# Patient Record
Sex: FEMALE | Race: WHITE | NOT HISPANIC OR LATINO | Employment: UNEMPLOYED | URBAN - METROPOLITAN AREA
[De-identification: names, ages, dates, MRNs, and addresses within clinical notes are randomized per-mention and may not be internally consistent; named-entity substitution may affect disease eponyms.]

---

## 2023-02-01 PROBLEM — R05.3 CHRONIC COUGH: Status: ACTIVE | Noted: 2023-02-01

## 2023-02-01 PROBLEM — F98.8 ATTENTION DEFICIT DISORDER WITHOUT HYPERACTIVITY: Status: ACTIVE | Noted: 2023-02-01

## 2023-02-01 PROBLEM — J30.9 ALLERGIC RHINITIS: Status: ACTIVE | Noted: 2023-02-01

## 2023-02-01 PROBLEM — R51.9 HEADACHE: Status: ACTIVE | Noted: 2023-02-01

## 2023-02-01 PROBLEM — R53.83 FATIGUE: Status: ACTIVE | Noted: 2023-02-01

## 2023-02-01 PROBLEM — G47.9 SLEEP DISORDER: Status: ACTIVE | Noted: 2023-02-01

## 2023-02-01 PROBLEM — E55.9 VITAMIN D DEFICIENCY: Status: ACTIVE | Noted: 2023-02-01

## 2023-02-01 PROBLEM — G47.10 HYPERSOMNIA: Status: ACTIVE | Noted: 2023-02-01

## 2023-02-01 PROBLEM — N92.6 MENSTRUAL IRREGULARITY: Status: ACTIVE | Noted: 2023-02-01

## 2023-02-01 PROBLEM — F41.9 ANXIETY: Status: ACTIVE | Noted: 2023-02-01

## 2023-02-01 PROBLEM — E83.10 DISORDER OF IRON METABOLISM: Status: ACTIVE | Noted: 2023-02-01

## 2023-02-01 PROBLEM — E66.3 OVERWEIGHT WITH BODY MASS INDEX (BMI) OF 28 TO 28.9 IN ADULT: Status: ACTIVE | Noted: 2023-02-01

## 2023-02-01 PROBLEM — H93.19 TINNITUS: Status: ACTIVE | Noted: 2023-02-01

## 2023-02-01 PROBLEM — R10.31 ABDOMINAL PAIN, RLQ (RIGHT LOWER QUADRANT): Status: ACTIVE | Noted: 2023-02-01

## 2023-02-01 PROBLEM — G43.909 MIGRAINE HEADACHE: Status: ACTIVE | Noted: 2023-02-01

## 2023-02-01 PROBLEM — R25.8 NOCTURNAL LEG MOVEMENTS: Status: ACTIVE | Noted: 2023-02-01

## 2023-02-01 PROBLEM — K62.89 ANAL IRRITATION: Status: ACTIVE | Noted: 2023-02-01

## 2023-02-01 RX ORDER — CHOLECALCIFEROL (VITAMIN D3) 125 MCG
CAPSULE ORAL
COMMUNITY
Start: 2022-06-27 | End: 2023-03-29

## 2023-02-01 RX ORDER — DICLOFENAC POTASSIUM 50 MG/1
POWDER, FOR SOLUTION ORAL AS NEEDED
COMMUNITY
Start: 2022-06-27

## 2023-02-01 RX ORDER — DEXTROAMPHETAMINE SACCHARATE, AMPHETAMINE ASPARTATE, DEXTROAMPHETAMINE SULFATE AND AMPHETAMINE SULFATE 3.75; 3.75; 3.75; 3.75 MG/1; MG/1; MG/1; MG/1
1 TABLET ORAL DAILY
COMMUNITY
Start: 2022-07-15

## 2023-02-01 RX ORDER — DULOXETIN HYDROCHLORIDE 20 MG/1
2 CAPSULE, DELAYED RELEASE ORAL DAILY
COMMUNITY
End: 2023-07-26 | Stop reason: SDUPTHER

## 2023-03-15 ENCOUNTER — APPOINTMENT (OUTPATIENT)
Dept: PRIMARY CARE | Facility: CLINIC | Age: 23
End: 2023-03-15
Payer: COMMERCIAL

## 2023-03-29 ENCOUNTER — OFFICE VISIT (OUTPATIENT)
Dept: PRIMARY CARE | Facility: CLINIC | Age: 23
End: 2023-03-29
Payer: COMMERCIAL

## 2023-03-29 VITALS
WEIGHT: 169.8 LBS | BODY MASS INDEX: 28.99 KG/M2 | HEIGHT: 64 IN | SYSTOLIC BLOOD PRESSURE: 120 MMHG | HEART RATE: 77 BPM | TEMPERATURE: 97.5 F | DIASTOLIC BLOOD PRESSURE: 79 MMHG

## 2023-03-29 DIAGNOSIS — Z01.419 WELL WOMAN EXAM: Primary | ICD-10-CM

## 2023-03-29 PROBLEM — R05.3 CHRONIC COUGH: Status: RESOLVED | Noted: 2023-02-01 | Resolved: 2023-03-29

## 2023-03-29 PROBLEM — H93.19 TINNITUS: Status: RESOLVED | Noted: 2023-02-01 | Resolved: 2023-03-29

## 2023-03-29 PROBLEM — R10.31 ABDOMINAL PAIN, RLQ (RIGHT LOWER QUADRANT): Status: RESOLVED | Noted: 2023-02-01 | Resolved: 2023-03-29

## 2023-03-29 PROBLEM — R51.9 HEADACHE: Status: RESOLVED | Noted: 2023-02-01 | Resolved: 2023-03-29

## 2023-03-29 PROBLEM — N92.6 MENSTRUAL IRREGULARITY: Status: RESOLVED | Noted: 2023-02-01 | Resolved: 2023-03-29

## 2023-03-29 PROCEDURE — 87591 N.GONORRHOEAE DNA AMP PROB: CPT

## 2023-03-29 PROCEDURE — 87491 CHLMYD TRACH DNA AMP PROBE: CPT

## 2023-03-29 PROCEDURE — 1036F TOBACCO NON-USER: CPT | Performed by: NURSE PRACTITIONER

## 2023-03-29 PROCEDURE — 99395 PREV VISIT EST AGE 18-39: CPT | Performed by: NURSE PRACTITIONER

## 2023-03-29 PROCEDURE — 88175 CYTOPATH C/V AUTO FLUID REDO: CPT
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
## 2023-03-30 PROBLEM — Z01.419 WELL WOMAN EXAM: Status: ACTIVE | Noted: 2023-03-30

## 2023-03-30 ASSESSMENT — ENCOUNTER SYMPTOMS
DYSPHORIC MOOD: 0
WHEEZING: 0
COUGH: 0
DIFFICULTY URINATING: 0
BACK PAIN: 0
SORE THROAT: 0
WEAKNESS: 0
DYSURIA: 0
FATIGUE: 0
DIZZINESS: 0
NERVOUS/ANXIOUS: 0
PHOTOPHOBIA: 0
ADENOPATHY: 0
UNEXPECTED WEIGHT CHANGE: 0
SLEEP DISTURBANCE: 0
CONSTIPATION: 0
CHEST TIGHTNESS: 0
SPEECH DIFFICULTY: 0
NAUSEA: 0
FREQUENCY: 0
MYALGIAS: 0
VOMITING: 0
RHINORRHEA: 0
PALPITATIONS: 0
SHORTNESS OF BREATH: 0
DIARRHEA: 0
FEVER: 0
CHILLS: 0
BLOOD IN STOOL: 0
BRUISES/BLEEDS EASILY: 0
POLYDIPSIA: 0
ABDOMINAL PAIN: 0
EYE DISCHARGE: 0
ARTHRALGIAS: 0
EYE PAIN: 0
EYE ITCHING: 0
NUMBNESS: 0
HEMATURIA: 0
HEADACHES: 0
POLYPHAGIA: 0
EYE REDNESS: 0
NECK PAIN: 0
SINUS PRESSURE: 0

## 2023-03-31 LAB
CHLAMYDIA TRACH., AMPLIFIED: NEGATIVE
N. GONORRHEA, AMPLIFIED: NEGATIVE

## 2023-04-04 LAB
COMPLETE PATHOLOGY REPORT: NORMAL
CONVERTED CLINICAL DIAGNOSIS-HISTORY: NORMAL
CONVERTED DIAGNOSIS COMMENT: NORMAL
CONVERTED FINAL DIAGNOSIS: NORMAL
CONVERTED FINAL REPORT PDF LINK TO COPY AND PASTE: NORMAL

## 2023-04-06 ENCOUNTER — TELEPHONE (OUTPATIENT)
Dept: PRIMARY CARE | Facility: CLINIC | Age: 23
End: 2023-04-06
Payer: COMMERCIAL

## 2023-04-06 NOTE — TELEPHONE ENCOUNTER
----- Message from TRENT Medrano sent at 4/4/2023  3:17 PM EDT -----  Please let her know her PAP was normal. Repeat testing in 3 years.

## 2023-07-26 DIAGNOSIS — F41.9 ANXIETY: Primary | ICD-10-CM

## 2023-07-26 RX ORDER — DULOXETIN HYDROCHLORIDE 20 MG/1
40 CAPSULE, DELAYED RELEASE ORAL DAILY
Qty: 180 CAPSULE | Refills: 0 | Status: SHIPPED | OUTPATIENT
Start: 2023-07-26 | End: 2023-11-22 | Stop reason: SDUPTHER

## 2023-07-26 NOTE — TELEPHONE ENCOUNTER
Patient requesting refill on her Duloxetine until she can make an appointment, Rx pended for approval

## 2023-11-22 ENCOUNTER — TELEPHONE (OUTPATIENT)
Dept: PRIMARY CARE | Facility: CLINIC | Age: 23
End: 2023-11-22
Payer: COMMERCIAL

## 2023-11-22 DIAGNOSIS — F41.9 ANXIETY: ICD-10-CM

## 2023-11-22 RX ORDER — DULOXETIN HYDROCHLORIDE 20 MG/1
40 CAPSULE, DELAYED RELEASE ORAL DAILY
Qty: 180 CAPSULE | Refills: 0 | Status: SHIPPED | OUTPATIENT
Start: 2023-11-22 | End: 2024-02-14 | Stop reason: SDUPTHER

## 2024-02-14 ENCOUNTER — OFFICE VISIT (OUTPATIENT)
Dept: PRIMARY CARE | Facility: CLINIC | Age: 24
End: 2024-02-14
Payer: COMMERCIAL

## 2024-02-14 VITALS
HEART RATE: 82 BPM | TEMPERATURE: 97.2 F | DIASTOLIC BLOOD PRESSURE: 82 MMHG | BODY MASS INDEX: 30.35 KG/M2 | HEIGHT: 65 IN | WEIGHT: 182.2 LBS | OXYGEN SATURATION: 98 % | SYSTOLIC BLOOD PRESSURE: 102 MMHG

## 2024-02-14 DIAGNOSIS — R39.15 URGENCY OF URINATION: ICD-10-CM

## 2024-02-14 DIAGNOSIS — R31.9 URINARY TRACT INFECTION WITH HEMATURIA, SITE UNSPECIFIED: Primary | ICD-10-CM

## 2024-02-14 DIAGNOSIS — R30.0 BURNING WITH URINATION: ICD-10-CM

## 2024-02-14 DIAGNOSIS — N39.0 URINARY TRACT INFECTION WITH HEMATURIA, SITE UNSPECIFIED: Primary | ICD-10-CM

## 2024-02-14 DIAGNOSIS — F41.9 ANXIETY: ICD-10-CM

## 2024-02-14 LAB
POC APPEARANCE, URINE: ABNORMAL
POC BILIRUBIN, URINE: NEGATIVE
POC BLOOD, URINE: ABNORMAL
POC COLOR, URINE: YELLOW
POC GLUCOSE, URINE: NEGATIVE MG/DL
POC KETONES, URINE: ABNORMAL MG/DL
POC LEUKOCYTES, URINE: ABNORMAL
POC NITRITE,URINE: NEGATIVE
POC PH, URINE: 6.5 PH
POC PROTEIN, URINE: ABNORMAL MG/DL
POC SPECIFIC GRAVITY, URINE: 1.02
POC UROBILINOGEN, URINE: 0.2 EU/DL

## 2024-02-14 PROCEDURE — 87086 URINE CULTURE/COLONY COUNT: CPT

## 2024-02-14 PROCEDURE — 87186 SC STD MICRODIL/AGAR DIL: CPT

## 2024-02-14 PROCEDURE — 81003 URINALYSIS AUTO W/O SCOPE: CPT | Performed by: NURSE PRACTITIONER

## 2024-02-14 PROCEDURE — 99214 OFFICE O/P EST MOD 30 MIN: CPT | Performed by: NURSE PRACTITIONER

## 2024-02-14 PROCEDURE — 1036F TOBACCO NON-USER: CPT | Performed by: NURSE PRACTITIONER

## 2024-02-14 RX ORDER — NITROFURANTOIN 25; 75 MG/1; MG/1
100 CAPSULE ORAL 2 TIMES DAILY
Qty: 10 CAPSULE | Refills: 0 | Status: SHIPPED | OUTPATIENT
Start: 2024-02-14 | End: 2024-02-19

## 2024-02-14 RX ORDER — DULOXETIN HYDROCHLORIDE 20 MG/1
40 CAPSULE, DELAYED RELEASE ORAL DAILY
Qty: 180 CAPSULE | Refills: 3 | Status: SHIPPED | OUTPATIENT
Start: 2024-02-14

## 2024-02-14 ASSESSMENT — ENCOUNTER SYMPTOMS
NAUSEA: 0
DIARRHEA: 0
DYSURIA: 1
CHILLS: 0
NERVOUS/ANXIOUS: 1
DIFFICULTY URINATING: 0
FATIGUE: 0
VOMITING: 0
ABDOMINAL PAIN: 0
FREQUENCY: 1
SLEEP DISTURBANCE: 0
FEVER: 0
DYSPHORIC MOOD: 0

## 2024-02-14 ASSESSMENT — PATIENT HEALTH QUESTIONNAIRE - PHQ9
1. LITTLE INTEREST OR PLEASURE IN DOING THINGS: NOT AT ALL
2. FEELING DOWN, DEPRESSED OR HOPELESS: NOT AT ALL
SUM OF ALL RESPONSES TO PHQ9 QUESTIONS 1 AND 2: 0

## 2024-02-14 NOTE — PROGRESS NOTES
"Rika Jalloh is a 23 y.o. female who presents for UTI (Burning while urinating and a feeling that she has to pee but can't pee. She also reported she was spotting when she was done with her period a 1.5 week ago).    UTI   Associated symptoms include frequency, hematuria and urgency. Pertinent negatives include no chills, nausea or vomiting.     She presents to the office with UTI symptoms since Monday. Noticed some spotting on Monday which she was not sure the cause.  Woke up this morning and had some burning with urination.  (+) frequency and urgency.  No fever or chills.  (+) blood noted in urine  No cloudy or foul smelling urine.  Feeling well in general. No back pain.  (+) cramping last night.  No other abdominal pain, nausea vomiting or diarrhea  LMP: 1/31/2024    Taking the Cymbalta as prescribed. Taking daily in the morning  No side effects.  No feeling sad, down, helpless or hopeless.  Motivation is ok.  No SI or HI.  Increased stress- just started Masters program  (+) excessive worry  No worry about worst case scenarios.  No panic attack.  Sleeping ok- takes Melatonin occasionally  Diet: Eating healthier  Exercise: Exercising 1 day a week- pilates  Caffeine use: rare  Alcohol use: 3 drinks a week  Drug use: None    Review of Systems   Constitutional:  Negative for chills, fatigue and fever.   Gastrointestinal:  Negative for abdominal pain, diarrhea, nausea and vomiting.   Genitourinary:  Positive for dysuria, frequency, hematuria and urgency. Negative for difficulty urinating.   Psychiatric/Behavioral:  Negative for dysphoric mood, self-injury, sleep disturbance and suicidal ideas. The patient is nervous/anxious.      Objective   /82 (BP Location: Left arm, Patient Position: Sitting)   Pulse 82   Temp 36.2 °C (97.2 °F) (Temporal)   Ht 1.646 m (5' 4.8\")   Wt 82.6 kg (182 lb 3.2 oz)   SpO2 98%   BMI 30.50 kg/m²     Physical Exam  Constitutional:       General: She is not in " acute distress.     Appearance: Normal appearance. She is not toxic-appearing.   Neck:      Thyroid: No thyroid mass or thyromegaly.   Cardiovascular:      Rate and Rhythm: Normal rate and regular rhythm.      Pulses: Normal pulses.      Heart sounds: Normal heart sounds, S1 normal and S2 normal. No murmur heard.  Pulmonary:      Effort: Pulmonary effort is normal.      Breath sounds: Normal breath sounds and air entry.   Abdominal:      General: Bowel sounds are normal.      Palpations: Abdomen is soft.      Tenderness: There is no abdominal tenderness. There is no right CVA tenderness or left CVA tenderness.   Musculoskeletal:      Right lower leg: No edema.      Left lower leg: No edema.   Lymphadenopathy:      Cervical: No cervical adenopathy.   Neurological:      Mental Status: She is alert and oriented to person, place, and time.   Psychiatric:         Mood and Affect: Mood normal.         Behavior: Behavior normal.         Thought Content: Thought content normal.         Judgment: Judgment normal.     Assessment/Plan   Problem List Items Addressed This Visit       Anxiety - Primary     Well-controlled.  Continue Cymbalta at current dose.         Relevant Medications    DULoxetine (Cymbalta) 20 mg DR capsule     Other Visit Diagnoses       Burning with urination        Relevant Medications    nitrofurantoin, macrocrystal-monohydrate, (Macrobid) 100 mg capsule    Other Relevant Orders    POCT UA Automated manually resulted (Completed)    Urine Culture    Urgency of urination        Relevant Medications    nitrofurantoin, macrocrystal-monohydrate, (Macrobid) 100 mg capsule    Other Relevant Orders    POCT UA Automated manually resulted (Completed)    Urine Culture    Urinary tract infection with hematuria, site unspecified        Relevant Medications    nitrofurantoin, macrocrystal-monohydrate, (Macrobid) 100 mg capsule          It has been a pleasure seeing you today!

## 2024-02-14 NOTE — PATIENT INSTRUCTIONS
Take the antibiotic as directed.  Will send the urine out for a culture and follow up on results.  Medications refilled today.

## 2024-02-15 ASSESSMENT — ENCOUNTER SYMPTOMS: HEMATURIA: 1

## 2024-02-17 LAB — BACTERIA UR CULT: ABNORMAL

## 2024-04-19 ENCOUNTER — TELEPHONE (OUTPATIENT)
Dept: PRIMARY CARE | Facility: CLINIC | Age: 24
End: 2024-04-19
Payer: COMMERCIAL

## 2024-04-19 NOTE — TELEPHONE ENCOUNTER
Pt is calling back saying should she stop taking the macrobid? She was told by [facility] she can either stop or continue taking the macrobid. Please advise.

## 2024-04-19 NOTE — TELEPHONE ENCOUNTER
Pt is calling in saying she had a recent visit to [facility] because she thought she had a UTI. She was told her WBC were elevated, started her on antibiotics but then called her back and said she didn't have a UTI. She mentioned last time she was in, you both discuss other options and she is wondering her next steps.

## 2024-04-23 ENCOUNTER — OFFICE VISIT (OUTPATIENT)
Dept: PRIMARY CARE | Facility: CLINIC | Age: 24
End: 2024-04-23
Payer: COMMERCIAL

## 2024-04-23 VITALS
TEMPERATURE: 97.3 F | BODY MASS INDEX: 30.44 KG/M2 | DIASTOLIC BLOOD PRESSURE: 72 MMHG | SYSTOLIC BLOOD PRESSURE: 100 MMHG | HEART RATE: 81 BPM | WEIGHT: 181.8 LBS | OXYGEN SATURATION: 97 %

## 2024-04-23 DIAGNOSIS — F41.9 ANXIETY: ICD-10-CM

## 2024-04-23 DIAGNOSIS — E07.89 THYROID FULLNESS: ICD-10-CM

## 2024-04-23 DIAGNOSIS — F98.8 ATTENTION DEFICIT DISORDER WITHOUT HYPERACTIVITY: Primary | ICD-10-CM

## 2024-04-23 DIAGNOSIS — Z51.81 THERAPEUTIC DRUG MONITORING: ICD-10-CM

## 2024-04-23 DIAGNOSIS — R30.0 DYSURIA: ICD-10-CM

## 2024-04-23 PROCEDURE — 80324 DRUG SCREEN AMPHETAMINES 1/2: CPT

## 2024-04-23 PROCEDURE — 81025 URINE PREGNANCY TEST: CPT | Performed by: NURSE PRACTITIONER

## 2024-04-23 PROCEDURE — 99214 OFFICE O/P EST MOD 30 MIN: CPT | Performed by: NURSE PRACTITIONER

## 2024-04-23 PROCEDURE — 80307 DRUG TEST PRSMV CHEM ANLYZR: CPT

## 2024-04-23 PROCEDURE — 87205 SMEAR GRAM STAIN: CPT

## 2024-04-23 PROCEDURE — 81003 URINALYSIS AUTO W/O SCOPE: CPT | Performed by: NURSE PRACTITIONER

## 2024-04-23 RX ORDER — DEXTROAMPHETAMINE SACCHARATE, AMPHETAMINE ASPARTATE, DEXTROAMPHETAMINE SULFATE AND AMPHETAMINE SULFATE 3.75; 3.75; 3.75; 3.75 MG/1; MG/1; MG/1; MG/1
15 TABLET ORAL DAILY
Qty: 30 TABLET | Refills: 0 | Status: SHIPPED | OUTPATIENT
Start: 2024-04-23 | End: 2024-05-23

## 2024-04-23 RX ORDER — NITROFURANTOIN 25; 75 MG/1; MG/1
CAPSULE ORAL
COMMUNITY
Start: 2024-04-16 | End: 2024-04-23 | Stop reason: ALTCHOICE

## 2024-04-23 RX ORDER — DEXTROAMPHETAMINE SACCHARATE, AMPHETAMINE ASPARTATE, DEXTROAMPHETAMINE SULFATE AND AMPHETAMINE SULFATE 3.75; 3.75; 3.75; 3.75 MG/1; MG/1; MG/1; MG/1
15 TABLET ORAL DAILY
Qty: 30 TABLET | Refills: 0 | Status: SHIPPED | OUTPATIENT
Start: 2024-06-22 | End: 2024-07-22

## 2024-04-23 RX ORDER — DULOXETIN HYDROCHLORIDE 30 MG/1
30 CAPSULE, DELAYED RELEASE ORAL DAILY
Qty: 90 CAPSULE | Refills: 0 | Status: SHIPPED | OUTPATIENT
Start: 2024-04-23

## 2024-04-23 RX ORDER — DEXTROAMPHETAMINE SACCHARATE, AMPHETAMINE ASPARTATE, DEXTROAMPHETAMINE SULFATE AND AMPHETAMINE SULFATE 3.75; 3.75; 3.75; 3.75 MG/1; MG/1; MG/1; MG/1
15 TABLET ORAL DAILY
Qty: 30 TABLET | Refills: 0 | Status: SHIPPED | OUTPATIENT
Start: 2024-05-23 | End: 2024-06-22

## 2024-04-23 ASSESSMENT — PATIENT HEALTH QUESTIONNAIRE - PHQ9
2. FEELING DOWN, DEPRESSED OR HOPELESS: SEVERAL DAYS
SUM OF ALL RESPONSES TO PHQ9 QUESTIONS 1 AND 2: 1
1. LITTLE INTEREST OR PLEASURE IN DOING THINGS: NOT AT ALL

## 2024-04-23 ASSESSMENT — ENCOUNTER SYMPTOMS
CHILLS: 0
SLEEP DISTURBANCE: 0
FATIGUE: 0
DYSURIA: 1
FEVER: 0
NERVOUS/ANXIOUS: 1
DYSPHORIC MOOD: 0

## 2024-04-23 NOTE — PROGRESS NOTES
Subjective    Santa Jalloh is a 24 y.o. female who presents for Gynecologic Exam and Anxiety (She wants to up medication dosage - and wants to update urine and contract for adderall).    HPI  She presents office today for a follow-up on intermittent burning with urination.  She reports she was swimming in the ocean and noticed some burning with urination about 2 weeks ago.  Had burning for 2 days, took Azo and symptoms went away for a week,, then came back.  (+)urinary frequency, urgency and burning.   (+) occasional burning for a day here and there as well  No unusual vaginal discharge.  No pain or odor with intercourse  (+) itching.   Period is due any day.   Generally spots when she ovulates.   Had intercourse about 2-3 weeks ago- pull out method.    Taking the Cymbalta as prescribed. Taking daily. Currently taking 40 mg daily.  No side effects.  No feeling sad, down, helpless or hopeless.  Motivation is good.  No SI or HI.  (+) Excessive worry  No worry about worst case scenarios.  Panic attacks: had one the other day -mostly work related.  (+) overwhelmed feeling.  Would like to increase the Duloxetine to 50 mg daily.    Counseling: In Counseling   Sleeping well with Melatonin  Diet: Overall pretty healthy  Exercise: once week.  Caffeine use:  rare caffeine  Alcohol use: 2 twice a week.  Drug use: None    OARRS:  MILAN Medrano-KIRTI on 4/23/2024  4:11 PM  I have personally reviewed the OARRS report for Santa Jalloh. I have considered the risks of abuse, dependence, addiction and diversion and I believe that it is clinically appropriate for Santa Jalloh to be prescribed this medication    Is the patient prescribed a combination of a benzodiazepine and opioid?  No    Last Urine Drug Screen / ordered today: Yes  No results found for this or any previous visit (from the past 8760 hour(s)).  N/A- ordered today    Controlled Substance Agreement:  Date of the Last Agreement: 04/23/2024  Reviewed  Controlled Substance Agreement including but not limited to the benefits, risks, and alternatives to treatment with a Controlled Substance medication(s).    Stimulants:   What is the patient's goal of therapy? Focus for school work- getting Masters degree  Is this being achieved with current treatment? yes    Activities of Daily Living:   Is your overall impression that this patient is benefiting (symptom reduction outweighs side effects) from stimulant therapy? Yes     1. Physical Functioning: Better  2. Family Relationship: Same  3. Social Relationship: Same  4. Mood: Same  5. Sleep Patterns: Same  6. Overall Function: Same    Reports appetite is decreased when taking Adderall. Sometimes a little shorter with others.   NO chest pain, SOB or palpitations.   Last dose of Adderall- one day last week.  Medication was started by pediatrics (notes reviewed). Generally takes intermittently but with being in school she feels she needs to remain on the mediation ever day.    Review of Systems   Constitutional:  Negative for chills, fatigue and fever.   Genitourinary:  Positive for dysuria and urgency. Negative for vaginal discharge and vaginal pain.   Psychiatric/Behavioral:  Negative for dysphoric mood, self-injury, sleep disturbance and suicidal ideas. The patient is nervous/anxious.        Objective   /72 (BP Location: Left arm, Patient Position: Sitting)   Pulse 81   Temp 36.3 °C (97.3 °F) (Temporal)   Wt 82.5 kg (181 lb 12.8 oz)   SpO2 97%   BMI 30.44 kg/m²     Physical Exam  Constitutional:       General: She is not in acute distress.     Appearance: Normal appearance. She is not toxic-appearing.   Eyes:      Extraocular Movements: Extraocular movements intact.      Conjunctiva/sclera: Conjunctivae normal.      Pupils: Pupils are equal, round, and reactive to light.   Neck:      Thyroid: Thyromegaly present.   Cardiovascular:      Rate and Rhythm: Normal rate and regular rhythm.      Pulses: Normal pulses.       Heart sounds: Normal heart sounds, S1 normal and S2 normal. No murmur heard.  Pulmonary:      Effort: Pulmonary effort is normal. No respiratory distress.      Breath sounds: Normal breath sounds and air entry.   Abdominal:      General: Bowel sounds are normal.      Palpations: Abdomen is soft.      Tenderness: There is no abdominal tenderness.   Genitourinary:     Cervix: Normal.      Comments: (+) blood noted in vagina  Musculoskeletal:      Right lower leg: No edema.      Left lower leg: No edema.   Lymphadenopathy:      Cervical: No cervical adenopathy.   Neurological:      Mental Status: She is alert and oriented to person, place, and time.   Psychiatric:         Attention and Perception: Attention normal.         Mood and Affect: Mood and affect normal.         Behavior: Behavior normal. Behavior is cooperative.         Thought Content: Thought content normal.         Cognition and Memory: Cognition normal.         Judgment: Judgment normal.         Assessment/Plan   Problem List Items Addressed This Visit       Anxiety    Relevant Medications    DULoxetine (Cymbalta) 30 mg DR capsule    Attention deficit disorder without hyperactivity - Primary    Relevant Medications    amphetamine-dextroamphetamine (Adderall) 15 mg tablet    amphetamine-dextroamphetamine (Adderall) 15 mg tablet (Start on 5/23/2024)    amphetamine-dextroamphetamine (Adderall) 15 mg tablet (Start on 6/22/2024)    Other Relevant Orders    Amphetamine Confirm, Urine    Drug Screen, Urine With Reflex to Confirmation     Other Visit Diagnoses       Therapeutic drug monitoring        Relevant Orders    Amphetamine Confirm, Urine    Drug Screen, Urine With Reflex to Confirmation    Thyroid fullness        Relevant Orders    TSH with reflex to Free T4 if abnormal    US thyroid    Dysuria        Relevant Orders    Vaginitis Gram Stain For Bacterial Vaginosis + Yeast             It has been a pleasure seeing you today!

## 2024-04-24 LAB
AMPHETAMINES UR QL SCN: ABNORMAL
BARBITURATES UR QL SCN: ABNORMAL
BENZODIAZ UR QL SCN: ABNORMAL
BZE UR QL SCN: ABNORMAL
CANNABINOIDS UR QL SCN: ABNORMAL
CLUE CELLS VAG LPF-#/AREA: NORMAL /[LPF]
FENTANYL+NORFENTANYL UR QL SCN: ABNORMAL
METHADONE UR QL SCN: ABNORMAL
NUGENT SCORE: 2
OPIATES UR QL SCN: ABNORMAL
OXYCODONE+OXYMORPHONE UR QL SCN: ABNORMAL
PCP UR QL SCN: ABNORMAL
POC APPEARANCE, URINE: CLEAR
POC BILIRUBIN, URINE: NEGATIVE
POC BLOOD, URINE: ABNORMAL
POC COLOR, URINE: YELLOW
POC GLUCOSE, URINE: NEGATIVE MG/DL
POC KETONES, URINE: NEGATIVE MG/DL
POC LEUKOCYTES, URINE: NEGATIVE
POC NITRITE,URINE: NEGATIVE
POC PH, URINE: 6.5 PH
POC PROTEIN, URINE: NEGATIVE MG/DL
POC SPECIFIC GRAVITY, URINE: >=1.03
POC UROBILINOGEN, URINE: 0.2 EU/DL
PREGNANCY TEST URINE, POC: NEGATIVE
YEAST VAG WET PREP-#/AREA: NORMAL

## 2024-04-27 LAB
AMPHET UR-MCNC: 114 NG/ML
MDA UR-MCNC: <200 NG/ML
MDEA UR-MCNC: <200 NG/ML
MDMA UR-MCNC: <200 NG/ML
METHAMPHET UR-MCNC: <200 NG/ML
PHENTERMINE UR CFM-MCNC: <200 NG/ML

## 2024-08-12 ENCOUNTER — APPOINTMENT (OUTPATIENT)
Dept: PRIMARY CARE | Facility: CLINIC | Age: 24
End: 2024-08-12

## 2024-09-10 ENCOUNTER — APPOINTMENT (OUTPATIENT)
Dept: PRIMARY CARE | Facility: CLINIC | Age: 24
End: 2024-09-10

## 2024-09-10 DIAGNOSIS — F98.8 ATTENTION DEFICIT DISORDER WITHOUT HYPERACTIVITY: ICD-10-CM

## 2024-09-10 PROCEDURE — 99213 OFFICE O/P EST LOW 20 MIN: CPT | Performed by: NURSE PRACTITIONER

## 2024-09-10 PROCEDURE — 1036F TOBACCO NON-USER: CPT | Performed by: NURSE PRACTITIONER

## 2024-09-10 RX ORDER — DEXTROAMPHETAMINE SACCHARATE, AMPHETAMINE ASPARTATE, DEXTROAMPHETAMINE SULFATE AND AMPHETAMINE SULFATE 3.75; 3.75; 3.75; 3.75 MG/1; MG/1; MG/1; MG/1
1 TABLET ORAL DAILY
Qty: 30 TABLET | Refills: 0 | Status: SHIPPED | OUTPATIENT
Start: 2024-09-10

## 2024-09-10 RX ORDER — DEXTROAMPHETAMINE SACCHARATE, AMPHETAMINE ASPARTATE, DEXTROAMPHETAMINE SULFATE AND AMPHETAMINE SULFATE 3.75; 3.75; 3.75; 3.75 MG/1; MG/1; MG/1; MG/1
15 TABLET ORAL DAILY
Qty: 30 TABLET | Refills: 0 | Status: SHIPPED | OUTPATIENT
Start: 2024-10-10 | End: 2024-11-09

## 2024-09-10 ASSESSMENT — ENCOUNTER SYMPTOMS
DECREASED CONCENTRATION: 1
PALPITATIONS: 0
FATIGUE: 0
CHILLS: 0
FEVER: 0
SHORTNESS OF BREATH: 0
DYSPHORIC MOOD: 0
SLEEP DISTURBANCE: 0
NERVOUS/ANXIOUS: 0

## 2024-09-10 ASSESSMENT — PATIENT HEALTH QUESTIONNAIRE - PHQ9
1. LITTLE INTEREST OR PLEASURE IN DOING THINGS: NOT AT ALL
SUM OF ALL RESPONSES TO PHQ9 QUESTIONS 1 AND 2: 0
2. FEELING DOWN, DEPRESSED OR HOPELESS: NOT AT ALL

## 2024-09-10 NOTE — PROGRESS NOTES
Subjective   Santa Jalloh is a 24 y.o. female who presents for Follow-up (HX of heart disease in the family and would like to discuss more on that).    An interactive audio and video telecommunication system which permits real time communications between the patient (at the originating site) and provider (at the distant site) was utilized to provide this telehealth service.    Verbal consent was requested and obtained from Santa Jalloh for a telehealth visit. All issues as below were discussed and addressed but no physical exam was performed. If it was felt that the patient should be evaluated in the clinic then they were directed there. The patient verbally consented to the visit.  She was located in the [facility] for the visit.         HPI  She presents today by virtual visit for medication refills and follow up.  She reports she is doing well on the current dose of Adderall.  She takes around 4 PM to help with school work in the evening.  No side effects noted.  No insomnia.  Appetite is good.  No chest pain, shortness of breath, palpitation, cough.    OARRS:  MILAN Medrano-CNP on 9/10/2024  9:30 AM  I have personally reviewed the OARRS report for Santa Jalloh. I have considered the risks of abuse, dependence, addiction and diversion and I believe that it is clinically appropriate for Santa Jalloh to be prescribed this medication    Is the patient prescribed a combination of a benzodiazepine and opioid?  No    Last Urine Drug Screen / ordered today: No  Recent Results (from the past 8760 hour(s))   Drug Screen, Urine With Reflex to Confirmation    Collection Time: 04/23/24  4:23 PM   Result Value Ref Range    Amphetamine Screen, Urine Presumptive Positive (A) Presumptive Negative    Barbiturate Screen, Urine Presumptive Negative Presumptive Negative    Benzodiazepines Screen, Urine Presumptive Negative Presumptive Negative    Cannabinoid Screen, Urine Presumptive Negative Presumptive  Negative    Cocaine Metabolite Screen, Urine Presumptive Negative Presumptive Negative    Fentanyl Screen, Urine Presumptive Negative Presumptive Negative    Opiate Screen, Urine Presumptive Negative Presumptive Negative    Oxycodone Screen, Urine Presumptive Negative Presumptive Negative    PCP Screen, Urine Presumptive Negative Presumptive Negative    Methadone Screen, Urine Presumptive Negative Presumptive Negative   Amphetamine Confirm, Urine    Collection Time: 04/23/24  4:23 PM   Result Value Ref Range    Methamphetamine Quant, Ur <200 ng/mL    MDA, Urine <200 ng/mL    MDEA, Urine <200 ng/mL    Phentermine,Urine <200 ng/mL    Amphetamines,Urine 114 ng/mL    MDMA, Urine <200 ng/mL     Results are as expected.     Controlled Substance Agreement:  Date of the Last Agreement: 4/23/2024  Reviewed Controlled Substance Agreement including but not limited to the benefits, risks, and alternatives to treatment with a Controlled Substance medication(s).    Stimulants:   What is the patient's goal of therapy? Help focus when studying in the evening for school.  Is this being achieved with current treatment? yes    Activities of Daily Living:   Is your overall impression that this patient is benefiting (symptom reduction outweighs side effects) from stimulant therapy? Yes     1. Physical Functioning: Better  2. Family Relationship: Same  3. Social Relationship: Same  4. Mood: Same  5. Sleep Patterns: Same  6. Overall Function: Better    She reports overall her mood has been good.   She continues to take the duloxetine 40 mg by mouth daily.  She takes 50 mg by mouth the week before her period.  No feeling sad, down, helpless, or hopeless.  No SI or HI.  No excessive worry or worry about worst-case areas.  No panic attacks.    She also inquires about having a cardiac workup.  Her dad has a pacemaker due to his heart stopping.  Her mom and brother both had WPW and underwent ablations.  She spoke with their cardiologist who  did not recommended any further evaluation unless she should develop symptoms.    Review of Systems   Constitutional:  Negative for chills, fatigue and fever.   Respiratory:  Negative for shortness of breath.    Cardiovascular:  Negative for chest pain, palpitations and leg swelling.   Psychiatric/Behavioral:  Positive for decreased concentration. Negative for dysphoric mood, self-injury, sleep disturbance and suicidal ideas. The patient is not nervous/anxious.      Objective   There were no vitals taken for this visit.    Physical Exam  Constitutional:       General: She is not in acute distress.     Appearance: Normal appearance. She is not toxic-appearing.   Pulmonary:      Effort: Pulmonary effort is normal. No respiratory distress.   Neurological:      Mental Status: She is alert and oriented to person, place, and time.   Psychiatric:         Mood and Affect: Mood normal.         Behavior: Behavior normal.         Thought Content: Thought content normal.         Judgment: Judgment normal.         Assessment/Plan   Problem List Items Addressed This Visit       Attention deficit disorder without hyperactivity    Relevant Medications    amphetamine-dextroamphetamine (Adderall) 15 mg tablet    amphetamine-dextroamphetamine (Adderall) 15 mg tablet (Start on 10/10/2024)     Stable on the current dose of Adderall.  Will need to come in for an in office visit in a couple months.    It has been a pleasure seeing you today!

## 2024-10-02 ENCOUNTER — OFFICE VISIT (OUTPATIENT)
Dept: URGENT CARE | Age: 24
End: 2024-10-02
Payer: COMMERCIAL

## 2024-10-02 ENCOUNTER — ANCILLARY PROCEDURE (OUTPATIENT)
Dept: URGENT CARE | Age: 24
End: 2024-10-02
Payer: COMMERCIAL

## 2024-10-02 VITALS — TEMPERATURE: 98 F | OXYGEN SATURATION: 100 % | HEART RATE: 85 BPM

## 2024-10-02 DIAGNOSIS — R05.1 ACUTE COUGH: Primary | ICD-10-CM

## 2024-10-02 DIAGNOSIS — R05.1 ACUTE COUGH: ICD-10-CM

## 2024-10-02 PROCEDURE — 1036F TOBACCO NON-USER: CPT | Performed by: NURSE PRACTITIONER

## 2024-10-02 PROCEDURE — 99204 OFFICE O/P NEW MOD 45 MIN: CPT | Performed by: NURSE PRACTITIONER

## 2024-10-02 RX ORDER — METHYLPREDNISOLONE 4 MG/1
TABLET ORAL
Qty: 21 TABLET | Refills: 0 | Status: SHIPPED | OUTPATIENT
Start: 2024-10-02 | End: 2024-10-09

## 2024-10-02 ASSESSMENT — ENCOUNTER SYMPTOMS
COUGH: 1
STRIDOR: 0
EYES NEGATIVE: 1
CARDIOVASCULAR NEGATIVE: 1
GASTROINTESTINAL NEGATIVE: 1
CONSTITUTIONAL NEGATIVE: 1
SHORTNESS OF BREATH: 0

## 2024-10-02 NOTE — PROGRESS NOTES
Subjective   Patient ID: Santa Jalloh is a 24 y.o. female. They present today with a chief complaint of Cough.    History of Present Illness  Ptient presents with c/o continued cough.  She was seen at [facility] on Saturday for a cough and was given Albuterol and Tessalon Perles.  States she was told to go to  to get CXR if she continues to cough.  States she is going to [place] next week and wants a CXR before she goes.      Past Medical History  Allergies as of 10/02/2024 - Reviewed 10/02/2024   Allergen Reaction Noted    Amoxicillin Unknown 02/01/2023    Diphenhydramine hcl Unknown 02/01/2023    Ondansetron hcl Swelling 02/01/2023       (Not in a hospital admission)       Past Medical History:   Diagnosis Date    33 weeks gestation of pregnancy ([facility]) 07/06/2015    33 weeks gestation of pregnancy    ADHD (attention deficit hyperactivity disorder)     Allergic     Concussion 07/13/2015    Episodic tension type headache 02/23/2016    Migraine, unspecified, not intractable, without status migrainosus     Migraine    Osgood-Schlatter's disease 10/01/2013    Other specified health status     Known health problems: none    Other symptoms and signs involving the nervous system 08/14/2015    Suspected sleep apnea    Outcome of delivery, unspecified 07/06/2015    Twin birth    Personal history of other diseases of the respiratory system 12/12/2017    History of acute sinusitis    Post concussion syndrome 07/27/2015       Past Surgical History:   Procedure Laterality Date    WISDOM TOOTH EXTRACTION          reports that she has never smoked. She has been exposed to tobacco smoke. She has never used smokeless tobacco. She reports current alcohol use of about 9.0 - 10.0 standard drinks of alcohol per week. She reports that she does not use drugs.    Review of Systems  Review of Systems   Constitutional: Negative.    HENT: Negative.     Eyes: Negative.    Respiratory:  Positive for cough. Negative for shortness of  breath and stridor.    Cardiovascular: Negative.    Gastrointestinal: Negative.                                   Objective    Vitals:    10/02/24 1722   Pulse: 85   Temp: 36.7 °C (98 °F)   SpO2: 100%     No LMP recorded.    Physical Exam  Constitutional:       Appearance: Normal appearance.   HENT:      Head: Normocephalic and atraumatic.   Cardiovascular:      Rate and Rhythm: Normal rate and regular rhythm.      Pulses: Normal pulses.      Heart sounds: Normal heart sounds.   Pulmonary:      Effort: Pulmonary effort is normal.      Breath sounds: Normal breath sounds.   Abdominal:      General: Abdomen is flat. Bowel sounds are normal.      Palpations: Abdomen is soft.   Neurological:      Mental Status: Patient is alert.     Procedures    Point of Care Test & Imaging Results from this visit  No results found for this visit on 10/02/24.   No results found.    Diagnostic study results (if any) were reviewed by [facility].    Assessment/Plan   Allergies, medications, history, and pertinent labs/EKGs/Imaging reviewed by TRENT Godinez.     Medical Decision Making  Negative CXR.  Will start patient on Medrol Dosepak for cough.      At time of discharge patient was clinically well-appearing and HDS for outpatient management. The patient and/or family was educated regarding diagnosis, supportive care, OTC and Rx medications. The patient and/or family was given the opportunity to ask questions prior to discharge.  They verbalized understanding of my discussion of the plans for treatment, expected course, indications to return to  or seek further evaluation in ED, and the need for timely follow up as directed.   They were provided with a work/school excuse if requested.      Orders and Diagnoses  There are no diagnoses linked to this encounter.    Medical Admin Record      Patient disposition: Home    Electronically signed by [facility]  5:28 PM

## 2024-10-07 ENCOUNTER — PATIENT MESSAGE (OUTPATIENT)
Dept: PRIMARY CARE | Facility: CLINIC | Age: 24
End: 2024-10-07
Payer: COMMERCIAL

## 2024-10-07 DIAGNOSIS — R05.1 ACUTE COUGH: Primary | ICD-10-CM

## 2024-10-08 ENCOUNTER — OFFICE VISIT (OUTPATIENT)
Dept: URGENT CARE | Age: 24
End: 2024-10-08
Payer: COMMERCIAL

## 2024-10-08 VITALS
RESPIRATION RATE: 15 BRPM | DIASTOLIC BLOOD PRESSURE: 81 MMHG | BODY MASS INDEX: 30.3 KG/M2 | HEART RATE: 106 BPM | WEIGHT: 181 LBS | SYSTOLIC BLOOD PRESSURE: 141 MMHG | OXYGEN SATURATION: 97 % | TEMPERATURE: 98.4 F

## 2024-10-08 DIAGNOSIS — R05.1 ACUTE COUGH: Primary | ICD-10-CM

## 2024-10-08 DIAGNOSIS — Z20.822 SUSPECTED 2019-NCOV INFECTION: ICD-10-CM

## 2024-10-08 LAB
POC RAPID INFLUENZA A: NEGATIVE
POC RAPID INFLUENZA B: NEGATIVE
POC SARS-COV-2 AG BINAX: NORMAL

## 2024-10-08 RX ORDER — DOXYCYCLINE 100 MG/1
100 CAPSULE ORAL 2 TIMES DAILY
Qty: 14 CAPSULE | Refills: 0 | Status: SHIPPED | OUTPATIENT
Start: 2024-10-08 | End: 2024-10-15

## 2024-10-08 NOTE — PROGRESS NOTES
Subjective   History of Present Illness: Santa Jalloh is a 24 y.o. female. They present today with a chief complaint of a non productive cough, fatigue, myalgia x 10 days. Mucinex, one course of steroid, and albuterol inh. Didn't alleviate symptoms.        Past Medical History  Allergies as of 10/08/2024 - Reviewed 10/08/2024   Allergen Reaction Noted    Amoxicillin Unknown 02/01/2023    Diphenhydramine hcl Unknown 02/01/2023    Ondansetron hcl Swelling 02/01/2023       (Not in a hospital admission)       Past Medical History:   Diagnosis Date    33 weeks gestation of pregnancy ([facility]) 07/06/2015    33 weeks gestation of pregnancy    ADHD (attention deficit hyperactivity disorder)     Allergic     Concussion 07/13/2015    Episodic tension type headache 02/23/2016    Migraine, unspecified, not intractable, without status migrainosus     Migraine    Osgood-Schlatter's disease 10/01/2013    Other specified health status     Known health problems: none    Other symptoms and signs involving the nervous system 08/14/2015    Suspected sleep apnea    Outcome of delivery, unspecified 07/06/2015    Twin birth    Personal history of other diseases of the respiratory system 12/12/2017    History of acute sinusitis    Post concussion syndrome 07/27/2015       Past Surgical History:   Procedure Laterality Date    WISDOM TOOTH EXTRACTION          reports that she has never smoked. She has been exposed to tobacco smoke. She has never used smokeless tobacco. She reports current alcohol use of about 9.0 - 10.0 standard drinks of alcohol per week. She reports that she does not use drugs.    Review of Systems  Review of Systems                               Objective    Vitals:    10/08/24 1711   BP: 141/81   BP Location: Left arm   Patient Position: Sitting   BP Cuff Size: Adult   Pulse: 106   Resp: 15   Temp: 36.9 °C (98.4 °F)   TempSrc: Oral   SpO2: 97%   Weight: 82.1 kg (181 lb)     Patient's last menstrual period was  09/30/2024 (approximate).    Physical Exam  Constitutional:       Appearance: She is ill-appearing.   HENT:      Head: Normocephalic and atraumatic.      Nose: Nose normal.      Mouth/Throat:      Mouth: Mucous membranes are moist.   Eyes:      Extraocular Movements: Extraocular movements intact.      Conjunctiva/sclera: Conjunctivae normal.      Pupils: Pupils are equal, round, and reactive to light.   Cardiovascular:      Rate and Rhythm: Normal rate and regular rhythm.      Heart sounds: No murmur heard.  Pulmonary:      Effort: Pulmonary effort is normal.      Breath sounds: Decreased breath sounds present. No wheezing, rhonchi or rales.   Skin:     General: Skin is warm.   Neurological:      Mental Status: She is alert and oriented to person, place, and time.   Psychiatric:         Mood and Affect: Mood normal.         Behavior: Behavior normal.             Point of Care Test & Imaging Results from this visit  Results for orders placed or performed in visit on 10/08/24   POCT Covid-19 Rapid Antigen   Result Value Ref Range    POC CRISTHIAN-COV-2 AG  Presumptive negative test for SARS-CoV-2 (no antigen detected)     Presumptive negative test for SARS-CoV-2 (no antigen detected)   POCT Influenza A/B manually resulted   Result Value Ref Range    POC Rapid Influenza A Negative Negative    POC Rapid Influenza B Negative Negative      No results found.    Diagnostic study results (if any) were reviewed by Johnson Rubio PA-C.    Assessment/Plan   Allergies, medications, history, and pertinent labs/EKGs/Imaging reviewed by Johnson Rubio PA-C.     Medical Decision Making  -         Patient is educated about their diagnoses.     -          Discussed medications benefits and adverse effects.     -          Answered all patient’s questions.     -          Patient will call 911 or go to the nearest ED if worsen symptoms .     -          Patient is agreeable to the plan of care and is deemed stable upon discharge.     -           Follow up with your primary care provider in two days.      Orders and Diagnoses  Diagnoses and all orders for this visit:  Acute cough  -     POCT Covid-19 Rapid Antigen  -     POCT Influenza A/B manually resulted  -     doxycycline (Vibramycin) 100 mg capsule; Take 1 capsule (100 mg) by mouth 2 times a day for 7 days. Take with at least 8 ounces (large glass) of water, do not lie down for 30 minutes after  Suspected 2019-nCoV infection  -     POCT Covid-19 Rapid Antigen      Medical Admin Record      Patient disposition: Home    Electronically signed by Johnson Rubio PA-C  5:30 PM

## 2024-10-16 ENCOUNTER — APPOINTMENT (OUTPATIENT)
Dept: PRIMARY CARE | Facility: CLINIC | Age: 24
End: 2024-10-16
Payer: COMMERCIAL

## 2024-10-16 ASSESSMENT — ENCOUNTER SYMPTOMS
HEADACHES: 1
COUGH: 1
WEIGHT LOSS: 1
MYALGIAS: 1
FEVER: 0
WHEEZING: 1
SWEATS: 0
HEMOPTYSIS: 0
SORE THROAT: 1
RHINORRHEA: 1
SHORTNESS OF BREATH: 1
HEARTBURN: 0
CHILLS: 1

## 2024-10-17 ENCOUNTER — OFFICE VISIT (OUTPATIENT)
Dept: PRIMARY CARE | Facility: CLINIC | Age: 24
End: 2024-10-17
Payer: COMMERCIAL

## 2024-10-17 VITALS
HEART RATE: 102 BPM | TEMPERATURE: 98 F | OXYGEN SATURATION: 98 % | WEIGHT: 186.8 LBS | BODY MASS INDEX: 31.27 KG/M2 | DIASTOLIC BLOOD PRESSURE: 82 MMHG | SYSTOLIC BLOOD PRESSURE: 100 MMHG

## 2024-10-17 DIAGNOSIS — J40 BRONCHITIS: Primary | ICD-10-CM

## 2024-10-17 PROCEDURE — 1036F TOBACCO NON-USER: CPT | Performed by: STUDENT IN AN ORGANIZED HEALTH CARE EDUCATION/TRAINING PROGRAM

## 2024-10-17 PROCEDURE — 99213 OFFICE O/P EST LOW 20 MIN: CPT | Performed by: STUDENT IN AN ORGANIZED HEALTH CARE EDUCATION/TRAINING PROGRAM

## 2024-10-17 RX ORDER — BENZONATATE 100 MG/1
CAPSULE ORAL
COMMUNITY
Start: 2024-09-29 | End: 2024-10-17 | Stop reason: SDUPTHER

## 2024-10-17 RX ORDER — ALBUTEROL SULFATE 90 UG/1
1-2 INHALANT RESPIRATORY (INHALATION)
COMMUNITY
Start: 2024-09-29 | End: 2024-10-29

## 2024-10-17 RX ORDER — BENZONATATE 100 MG/1
200 CAPSULE ORAL 3 TIMES DAILY PRN
Qty: 20 CAPSULE | Refills: 0 | Status: SHIPPED | OUTPATIENT
Start: 2024-10-17

## 2024-10-17 RX ORDER — ALBUTEROL SULFATE AND BUDESONIDE 90; 80 UG/1; UG/1
2 AEROSOL, METERED RESPIRATORY (INHALATION) EVERY 6 HOURS PRN
Qty: 10.7 G | Refills: 0 | Status: SHIPPED | OUTPATIENT
Start: 2024-10-17

## 2024-10-17 RX ORDER — AZITHROMYCIN 250 MG/1
TABLET, FILM COATED ORAL
Qty: 6 TABLET | Refills: 0 | Status: SHIPPED | OUTPATIENT
Start: 2024-10-17 | End: 2024-10-22

## 2024-10-17 ASSESSMENT — ENCOUNTER SYMPTOMS
WHEEZING: 1
COUGH: 1
FEVER: 0
HEADACHES: 1
SWEATS: 0
HEARTBURN: 0
SHORTNESS OF BREATH: 1
RHINORRHEA: 1
SORE THROAT: 1
CHILLS: 1
HEMOPTYSIS: 0
WEIGHT LOSS: 1
MYALGIAS: 1

## 2024-10-17 NOTE — PROGRESS NOTES
Assessment/Plan   Problem List Items Addressed This Visit    None  Visit Diagnoses         Codes    Bronchitis    -  Primary J40    Relevant Medications    benzonatate (Tessalon) 100 mg capsule    azithromycin (Zithromax) 250 mg tablet    albuterol-budesonide (Airsupra) 90-80 mcg/actuation inhaler            Subjective   Patient ID: Santa Jalloh is a 24 y.o. female who presents for URI (This all started 3 weeks ago. Was in [place] and when she returned she had a dry cough and has moved to a wet cough, and she can't lay down without coughing and sometimes when she eats food/drinks triggers the coughing and this past week she's had a post nasal drip. She said she feels she's gasping for air once she's done with her coughing fits. She is just finished antibiotic's of doxycyline 2 days ago. ) and Flu Vaccine (Pt already got it. ).  URI   Associated symptoms include chest pain, coughing, headaches, rhinorrhea, a sore throat and wheezing. Pertinent negatives include no ear pain or rash.   Cough  This is a new problem. The current episode started 1 to 4 weeks ago. The problem has been waxing and waning. The problem occurs hourly. The cough is Productive of purulent sputum. Associated symptoms include chest pain, chills, headaches, myalgias, nasal congestion, postnasal drip, rhinorrhea, a sore throat, shortness of breath, weight loss and wheezing. Pertinent negatives include no ear congestion, ear pain, fever, heartburn, hemoptysis, rash or sweats. The symptoms are aggravated by cold air, exercise, lying down and stress. She has tried oral steroids and a beta-agonist inhaler for the symptoms. The treatment provided mild relief. There is no history of asthma.       Patient'ssymptoms started 3 weeks ago as a dry cough while she was in [facility]. When [place] arrived to [place] her cough worsened and was diagnosed with bronchitis and was given steroid, she later re-presented to  and had clear CXR and was given steroid dospak. A  week ago last Wednesday she went to  and was given doxycyline. She did feel slightly better since taking doxycyline.     Review of Systems   Constitutional:  Positive for chills and weight loss. Negative for fever.   HENT:  Positive for postnasal drip, rhinorrhea and sore throat. Negative for ear pain.    Respiratory:  Positive for cough, shortness of breath and wheezing. Negative for hemoptysis.    Cardiovascular:  Positive for chest pain.   Gastrointestinal:  Negative for heartburn.   Musculoskeletal:  Positive for myalgias.   Skin:  Negative for rash.   Neurological:  Positive for headaches.       Objective   Physical Exam  Constitutional:       General: She is not in acute distress.     Appearance: Normal appearance. She is not ill-appearing.   HENT:      Head: Normocephalic and atraumatic.      Mouth/Throat:      Mouth: Mucous membranes are moist.      Pharynx: Oropharynx is clear. No oropharyngeal exudate or posterior oropharyngeal erythema.   Eyes:      Extraocular Movements: Extraocular movements intact.   Cardiovascular:      Rate and Rhythm: Normal rate and regular rhythm.   Pulmonary:      Effort: Pulmonary effort is normal. No respiratory distress.      Breath sounds: Normal breath sounds. No stridor. No wheezing, rhonchi or rales.   Neurological:      Mental Status: She is alert.            Sacha Britton MD 10/17/24 7:29 AM

## 2024-10-18 ENCOUNTER — APPOINTMENT (OUTPATIENT)
Dept: PRIMARY CARE | Facility: CLINIC | Age: 24
End: 2024-10-18
Payer: COMMERCIAL

## 2024-10-23 RX ORDER — CEFDINIR 300 MG/1
300 CAPSULE ORAL 2 TIMES DAILY
Qty: 20 CAPSULE | Refills: 0 | Status: SHIPPED | OUTPATIENT
Start: 2024-10-23 | End: 2024-11-02

## 2024-11-01 ENCOUNTER — TELEMEDICINE (OUTPATIENT)
Dept: PRIMARY CARE | Facility: CLINIC | Age: 24
End: 2024-11-01
Payer: COMMERCIAL

## 2024-11-01 ENCOUNTER — HOSPITAL ENCOUNTER (OUTPATIENT)
Dept: RADIOLOGY | Facility: HOSPITAL | Age: 24
Discharge: HOME | End: 2024-11-01
Payer: COMMERCIAL

## 2024-11-01 DIAGNOSIS — J40 BRONCHITIS: Primary | ICD-10-CM

## 2024-11-01 DIAGNOSIS — J40 BRONCHITIS: ICD-10-CM

## 2024-11-01 PROCEDURE — 99213 OFFICE O/P EST LOW 20 MIN: CPT | Performed by: STUDENT IN AN ORGANIZED HEALTH CARE EDUCATION/TRAINING PROGRAM

## 2024-11-01 PROCEDURE — 71046 X-RAY EXAM CHEST 2 VIEWS: CPT

## 2024-11-01 PROCEDURE — 1036F TOBACCO NON-USER: CPT | Performed by: STUDENT IN AN ORGANIZED HEALTH CARE EDUCATION/TRAINING PROGRAM

## 2024-11-01 RX ORDER — PREDNISONE 10 MG/1
TABLET ORAL
Qty: 27 TABLET | Refills: 0 | Status: SHIPPED | OUTPATIENT
Start: 2024-11-01 | End: 2024-11-11

## 2024-11-01 ASSESSMENT — ENCOUNTER SYMPTOMS
COUGH: 1
CHILLS: 0
WHEEZING: 1
ORTHOPNEA: 1
SPUTUM PRODUCTION: 1
DIZZINESS: 0
RHINORRHEA: 0
VOMITING: 1
SHORTNESS OF BREATH: 1
FEVER: 0
DIAPHORESIS: 0
CHEST TIGHTNESS: 1

## 2024-11-07 ENCOUNTER — APPOINTMENT (OUTPATIENT)
Dept: URGENT CARE | Age: 24
End: 2024-11-07
Payer: COMMERCIAL

## 2024-11-07 ENCOUNTER — TELEPHONE (OUTPATIENT)
Dept: PRIMARY CARE | Facility: CLINIC | Age: 24
End: 2024-11-07
Payer: COMMERCIAL

## 2024-11-07 NOTE — TELEPHONE ENCOUNTER
Per HIPAA LVM regarding results of xray.   Advised to contact the office if any questions/concerns   no

## 2024-11-15 ENCOUNTER — APPOINTMENT (OUTPATIENT)
Dept: PRIMARY CARE | Facility: CLINIC | Age: 24
End: 2024-11-15
Payer: COMMERCIAL

## 2024-11-15 VITALS
SYSTOLIC BLOOD PRESSURE: 122 MMHG | DIASTOLIC BLOOD PRESSURE: 82 MMHG | BODY MASS INDEX: 31.31 KG/M2 | HEART RATE: 101 BPM | TEMPERATURE: 98.1 F | WEIGHT: 187 LBS | OXYGEN SATURATION: 97 %

## 2024-11-15 DIAGNOSIS — F98.8 ATTENTION DEFICIT DISORDER WITHOUT HYPERACTIVITY: Primary | ICD-10-CM

## 2024-11-15 DIAGNOSIS — R06.02 SHORTNESS OF BREATH: ICD-10-CM

## 2024-11-15 DIAGNOSIS — R05.3 PERSISTENT COUGH FOR 3 WEEKS OR LONGER: ICD-10-CM

## 2024-11-15 DIAGNOSIS — F41.9 ANXIETY: ICD-10-CM

## 2024-11-15 PROCEDURE — 99214 OFFICE O/P EST MOD 30 MIN: CPT | Performed by: NURSE PRACTITIONER

## 2024-11-15 PROCEDURE — 93000 ELECTROCARDIOGRAM COMPLETE: CPT | Performed by: NURSE PRACTITIONER

## 2024-11-15 PROCEDURE — 1036F TOBACCO NON-USER: CPT | Performed by: NURSE PRACTITIONER

## 2024-11-15 RX ORDER — DEXTROAMPHETAMINE SACCHARATE, AMPHETAMINE ASPARTATE, DEXTROAMPHETAMINE SULFATE AND AMPHETAMINE SULFATE 3.75; 3.75; 3.75; 3.75 MG/1; MG/1; MG/1; MG/1
15 TABLET ORAL DAILY
Qty: 30 TABLET | Refills: 0 | Status: SHIPPED | OUTPATIENT
Start: 2024-12-15 | End: 2025-01-14

## 2024-11-15 RX ORDER — PROCHLORPERAZINE MALEATE 10 MG
TABLET ORAL
COMMUNITY
Start: 2024-11-07

## 2024-11-15 RX ORDER — OMEPRAZOLE 40 MG/1
40 CAPSULE, DELAYED RELEASE ORAL
COMMUNITY
Start: 2024-11-11

## 2024-11-15 RX ORDER — DULOXETIN HYDROCHLORIDE 20 MG/1
40 CAPSULE, DELAYED RELEASE ORAL DAILY
COMMUNITY

## 2024-11-15 RX ORDER — DEXTROAMPHETAMINE SACCHARATE, AMPHETAMINE ASPARTATE, DEXTROAMPHETAMINE SULFATE AND AMPHETAMINE SULFATE 3.75; 3.75; 3.75; 3.75 MG/1; MG/1; MG/1; MG/1
1 TABLET ORAL DAILY
Qty: 30 TABLET | Refills: 0 | Status: SHIPPED | OUTPATIENT
Start: 2025-01-14

## 2024-11-15 RX ORDER — DEXTROAMPHETAMINE SACCHARATE, AMPHETAMINE ASPARTATE, DEXTROAMPHETAMINE SULFATE AND AMPHETAMINE SULFATE 3.75; 3.75; 3.75; 3.75 MG/1; MG/1; MG/1; MG/1
15 TABLET ORAL DAILY
Qty: 30 TABLET | Refills: 0 | Status: SHIPPED | OUTPATIENT
Start: 2024-11-15 | End: 2024-12-15

## 2024-11-15 RX ORDER — CYCLOBENZAPRINE HCL 10 MG
10 TABLET ORAL EVERY 8 HOURS PRN
COMMUNITY
Start: 2024-11-10 | End: 2024-11-15

## 2024-11-15 ASSESSMENT — ENCOUNTER SYMPTOMS
DYSPHORIC MOOD: 0
CHILLS: 0
VOMITING: 1
WHEEZING: 1
COUGH: 1
SHORTNESS OF BREATH: 0
DIZZINESS: 0
WEAKNESS: 0
SLEEP DISTURBANCE: 0
NUMBNESS: 0
HEADACHES: 0
NERVOUS/ANXIOUS: 0
FEVER: 0
NAUSEA: 0
FATIGUE: 0
SORE THROAT: 0
CHEST TIGHTNESS: 1
DIARRHEA: 0
TROUBLE SWALLOWING: 0
PALPITATIONS: 0
ABDOMINAL PAIN: 0
VOICE CHANGE: 0

## 2024-11-15 ASSESSMENT — PATIENT HEALTH QUESTIONNAIRE - PHQ9
2. FEELING DOWN, DEPRESSED OR HOPELESS: NOT AT ALL
SUM OF ALL RESPONSES TO PHQ9 QUESTIONS 1 AND 2: 0
1. LITTLE INTEREST OR PLEASURE IN DOING THINGS: NOT AT ALL

## 2024-11-15 NOTE — PATIENT INSTRUCTIONS
Medications refilled today.   Plan to follow up in April.  Continue to follow with pulmonologist and keep me posted on symptoms.

## 2024-11-15 NOTE — ASSESSMENT & PLAN NOTE
Records reviewed from recent urgent care and ER visits as noted.  Continue to follow with pulmonology.

## 2024-11-15 NOTE — PROGRESS NOTES
Subjective   Santa Jalloh is a 24 y.o. female who presents for 2 mon fu (Pt wants an EKG today. Pt reported she has a concussion today from work.) and Flu Vaccine (Pt already got it.).    HPI  She presents to the office today to discuss cough.  She reports she started with a cough 9/26/2024 when in [place].   The cough was dry and had a lot of chest tightness and pain when this occurred.  No fever or chills.   (+) diarrhea while there as well.   No one else was sick.  9/28/2024-came home and pain in chest was worse.  (+) coughing fits and lightheaded wit them  (+)SOB but only with coughing  9/29/2024 went to [facility] and diagnosed with bronchitis  Treated with Albuterol and Tessalon.  Albuterol helped at first.   10/2/2024 went to urgent care-5 day steroid and negative chest x-ray.  10/8/2024- still with symptoms- back to urgent care and started on Doxycycline.  Then started with runny nose and nasal congestion.   No sore throat.  10/14/2024 vomiting with coughing/cough more productive.  10/17/2024 came into office and was given Azithromycin and Airsupra.  Then had VV- given Prednisone and repeat chest x-ray.    No Sob outside of coughing.   No further chest pain.  (+) wheezing with coughing fits.  Coughing fits are becoming less frequent  No calf pain, swelling.   11/4/2024 pain on the right bottom of ribs.   11/7/2024- concussion at work- workers comp  11/10/2024-to ER for the right rib pain/coughed and felt a pop and severe pain.    Reviewed chest x-rays,  D-Dimer, respiratory panel, cbc, and BMP results from prior visits    She is now following with a pulmonologist. Ct chest pending 12/2/2024  PFT normal. Sleep study pending    OARRS:  MILAN Medrano-CNP on 11/15/2024  8:24 AM  I have personally reviewed the OARRS report for Santa Jalloh. I have considered the risks of abuse, dependence, addiction and diversion and I believe that it is clinically appropriate for Santa Jalloh to be  prescribed this medication    Is the patient prescribed a combination of a benzodiazepine and opioid?  No    Last Urine Drug Screen / ordered today: No  Recent Results (from the past 8760 hours)   Drug Screen, Urine With Reflex to Confirmation    Collection Time: 04/23/24  4:23 PM   Result Value Ref Range    Amphetamine Screen, Urine Presumptive Positive (A) Presumptive Negative    Barbiturate Screen, Urine Presumptive Negative Presumptive Negative    Benzodiazepines Screen, Urine Presumptive Negative Presumptive Negative    Cannabinoid Screen, Urine Presumptive Negative Presumptive Negative    Cocaine Metabolite Screen, Urine Presumptive Negative Presumptive Negative    Fentanyl Screen, Urine Presumptive Negative Presumptive Negative    Opiate Screen, Urine Presumptive Negative Presumptive Negative    Oxycodone Screen, Urine Presumptive Negative Presumptive Negative    PCP Screen, Urine Presumptive Negative Presumptive Negative    Methadone Screen, Urine Presumptive Negative Presumptive Negative   Amphetamine Confirm, Urine    Collection Time: 04/23/24  4:23 PM   Result Value Ref Range    Methamphetamine Quant, Ur <200 ng/mL    MDA, Urine <200 ng/mL    MDEA, Urine <200 ng/mL    Phentermine,Urine <200 ng/mL    Amphetamines,Urine 114 ng/mL    MDMA, Urine <200 ng/mL     Results are as expected.       Controlled Substance Agreement:  Date of the Last Agreement: 4/23/2024  Reviewed Controlled Substance Agreement including but not limited to the benefits, risks, and alternatives to treatment with a Controlled Substance medication(s).    Stimulants:   What is the patient's goal of therapy? Help focus while at work and with school work  Is this being achieved with current treatment? yes    Activities of Daily Living:   Is your overall impression that this patient is benefiting (symptom reduction outweighs side effects) from stimulant therapy? Yes     1. Physical Functioning: Better  2. Family Relationship: Same  3. Social  Relationship: Same  4. Mood: Same  5. Sleep Patterns: Same  6. Overall Function: Better    Mood has been good in general.   No feeling sad, down, helpless or hopeless.  Annoyed and frustrated with being sick.  No SI or HI.   No excessive worry.  No worry about worst case scenarios.   No panic attacks.   Sleeping ok at night- Magnesium and Melatonin at bedtime.  Will be having a sleep study    Review of Systems   Constitutional:  Negative for chills, fatigue and fever.   HENT:  Negative for congestion, ear pain, postnasal drip, sore throat, trouble swallowing and voice change.    Eyes:  Negative for visual disturbance.   Respiratory:  Positive for cough, chest tightness and wheezing. Negative for shortness of breath.    Cardiovascular:  Negative for chest pain, palpitations and leg swelling.        Right rib pain   Gastrointestinal:  Positive for vomiting. Negative for abdominal pain, diarrhea and nausea.   Neurological:  Negative for dizziness, weakness, numbness and headaches.   Psychiatric/Behavioral:  Negative for dysphoric mood, self-injury, sleep disturbance and suicidal ideas. The patient is not nervous/anxious.        Objective   /82   Pulse 101   Temp 36.7 °C (98.1 °F) (Oral)   Wt 84.8 kg (187 lb)   SpO2 97%   BMI 31.31 kg/m²     Physical Exam  Constitutional:       General: She is not in acute distress.     Appearance: Normal appearance. She is not toxic-appearing.   Eyes:      Extraocular Movements: Extraocular movements intact.      Conjunctiva/sclera: Conjunctivae normal.      Pupils: Pupils are equal, round, and reactive to light.   Neck:      Thyroid: Thyromegaly present. No thyroid tenderness.   Cardiovascular:      Rate and Rhythm: Normal rate and regular rhythm.      Pulses: Normal pulses.      Heart sounds: Normal heart sounds, S1 normal and S2 normal. No murmur heard.  Pulmonary:      Effort: Pulmonary effort is normal. No respiratory distress.      Breath sounds: Normal breath sounds.    Abdominal:      General: Bowel sounds are normal.      Palpations: Abdomen is soft.      Tenderness: There is no abdominal tenderness.   Musculoskeletal:      Right lower leg: No edema.      Left lower leg: No edema.   Lymphadenopathy:      Cervical: No cervical adenopathy.   Neurological:      Mental Status: She is alert and oriented to person, place, and time.   Psychiatric:         Attention and Perception: Attention normal.         Mood and Affect: Mood and affect normal.         Behavior: Behavior normal. Behavior is cooperative.         Thought Content: Thought content normal.         Cognition and Memory: Cognition normal.         Judgment: Judgment normal.         Assessment/Plan   Problem List Items Addressed This Visit       Anxiety     Well-controlled on Cymbalta.  Continue with current dose.         Attention deficit disorder without hyperactivity - Primary     Stable on the current dose of Adderall.  Refills provided today.  Continue.  Follow-up in April.         Relevant Medications    amphetamine-dextroamphetamine (Adderall) 15 mg tablet (Start on 1/14/2025)    amphetamine-dextroamphetamine (Adderall) 15 mg tablet (Start on 12/15/2024)    amphetamine-dextroamphetamine (Adderall) 15 mg tablet    Persistent cough for 3 weeks or longer     Records reviewed from recent urgent care and ER visits as noted.  Continue to follow with pulmonology.         Relevant Orders    ECG 12 lead (Clinic Performed) (Completed)     Other Visit Diagnoses       Shortness of breath        Relevant Orders    ECG 12 lead (Clinic Performed) (Completed)          It has been a pleasure seeing you today!

## 2024-12-20 ENCOUNTER — TELEPHONE (OUTPATIENT)
Dept: PRIMARY CARE | Facility: CLINIC | Age: 24
End: 2024-12-20
Payer: COMMERCIAL

## 2024-12-20 DIAGNOSIS — F41.9 ANXIETY: Primary | ICD-10-CM

## 2024-12-20 RX ORDER — DULOXETIN HYDROCHLORIDE 20 MG/1
40 CAPSULE, DELAYED RELEASE ORAL DAILY
Qty: 180 CAPSULE | Refills: 1 | Status: SHIPPED | OUTPATIENT
Start: 2024-12-20

## 2024-12-20 NOTE — TELEPHONE ENCOUNTER
Ana from [facility] is trying to fill her Duloxetine but it looks like it was deleted from us. She said it should be  40mg BID    Please send to [facility]

## 2025-04-22 ENCOUNTER — APPOINTMENT (OUTPATIENT)
Dept: PRIMARY CARE | Facility: CLINIC | Age: 25
End: 2025-04-22
Payer: COMMERCIAL

## 2025-04-22 VITALS
HEIGHT: 64 IN | TEMPERATURE: 97.3 F | BODY MASS INDEX: 31.34 KG/M2 | DIASTOLIC BLOOD PRESSURE: 72 MMHG | OXYGEN SATURATION: 96 % | WEIGHT: 183.6 LBS | HEART RATE: 73 BPM | SYSTOLIC BLOOD PRESSURE: 110 MMHG

## 2025-04-22 DIAGNOSIS — F98.8 ATTENTION DEFICIT DISORDER WITHOUT HYPERACTIVITY: Primary | ICD-10-CM

## 2025-04-22 DIAGNOSIS — F41.9 ANXIETY: ICD-10-CM

## 2025-04-22 PROCEDURE — 1036F TOBACCO NON-USER: CPT | Performed by: NURSE PRACTITIONER

## 2025-04-22 PROCEDURE — 3008F BODY MASS INDEX DOCD: CPT | Performed by: NURSE PRACTITIONER

## 2025-04-22 PROCEDURE — 99214 OFFICE O/P EST MOD 30 MIN: CPT | Performed by: NURSE PRACTITIONER

## 2025-04-22 ASSESSMENT — ENCOUNTER SYMPTOMS
COUGH: 0
SHORTNESS OF BREATH: 0
DIZZINESS: 0
CHEST TIGHTNESS: 0
PALPITATIONS: 0
VOMITING: 0
NAUSEA: 0
NERVOUS/ANXIOUS: 1
DIARRHEA: 0
HEADACHES: 0
DYSPHORIC MOOD: 0
ABDOMINAL PAIN: 0
FATIGUE: 0
SLEEP DISTURBANCE: 0
CHILLS: 0
FEVER: 0
WEAKNESS: 0
NUMBNESS: 0

## 2025-04-22 ASSESSMENT — PATIENT HEALTH QUESTIONNAIRE - PHQ9
SUM OF ALL RESPONSES TO PHQ9 QUESTIONS 1 AND 2: 0
2. FEELING DOWN, DEPRESSED OR HOPELESS: NOT AT ALL
1. LITTLE INTEREST OR PLEASURE IN DOING THINGS: NOT AT ALL

## 2025-04-22 NOTE — ASSESSMENT & PLAN NOTE
Will need to follow up in 1 month to update UDS since took gummy Friday. Advised cannot prescribe Adderall until has negative UDS in one month. Will also update CSA at that time to keep them together. Reviewed today she cannot use any THC product when on Adderall.

## 2025-04-22 NOTE — PROGRESS NOTES
Subjective   Santa Jalloh is a 25 y.o. female who presents for Med Refill (Pt reported she took a gummie the other day. Urine and Contract due today).    Med Refill  Pertinent negatives include no abdominal pain, chest pain, chills, coughing, fatigue, fever, headaches, nausea, numbness, vomiting or weakness.       She presents to the office today for medication refills and follow up.    .OARRS:  No data recorded  I have personally reviewed the OARRS report for Santa Jalloh. I have considered the risks of abuse, dependence, addiction and diversion and I believe that it is clinically appropriate for Santa Jalloh to be prescribed this medication    Is the patient prescribed a combination of a benzodiazepine and opioid?  No    Last Urine Drug Screen / ordered today:No  Recent Results (from the past 8760 hours)   Drug Screen, Urine With Reflex to Confirmation    Collection Time: 04/23/24  4:23 PM   Result Value Ref Range    Amphetamine Screen, Urine Presumptive Positive (A) Presumptive Negative    Barbiturate Screen, Urine Presumptive Negative Presumptive Negative    Benzodiazepines Screen, Urine Presumptive Negative Presumptive Negative    Cannabinoid Screen, Urine Presumptive Negative Presumptive Negative    Cocaine Metabolite Screen, Urine Presumptive Negative Presumptive Negative    Fentanyl Screen, Urine Presumptive Negative Presumptive Negative    Opiate Screen, Urine Presumptive Negative Presumptive Negative    Oxycodone Screen, Urine Presumptive Negative Presumptive Negative    PCP Screen, Urine Presumptive Negative Presumptive Negative    Methadone Screen, Urine Presumptive Negative Presumptive Negative   Amphetamine Confirm, Urine    Collection Time: 04/23/24  4:23 PM   Result Value Ref Range    Methamphetamine Quant, Ur <200 ng/mL    MDA, Urine <200 ng/mL    MDEA, Urine <200 ng/mL    Phentermine,Urine <200 ng/mL    Amphetamines,Urine 114 ng/mL    MDMA, Urine <200 ng/mL       Controlled Substance  Agreement:  Date of the Last Agreement: 04/23/2024  Reviewed Controlled Substance Agreement including but not limited to the benefits, risks, and alternatives to treatment with a Controlled Substance medication(s).    Stimulants:   What is the patient's goal of therapy? Help focus while at school and to finish school work.   She reports she is taking the Adderall 15 mg in the morning. Occasionally will take twice a day on the weekends if she has a lot of school work.   Took an Adderall last night.   Is this being achieved with current treatment? yes    Activities of Daily Living:   Is your overall impression that this patient is benefiting (symptom reduction outweighs side effects) from stimulant therapy? Yes     1. Physical Functioning: Better  2. Family Relationship: Same  3. Social Relationship: Same  4. Mood: Same  5. Sleep Patterns: Same  6. Overall Function: Better    Taking the Cymbalta 40 mg as prescribed. Taking daily in the morning unless forgets  No side effects.  No feeling sad, down, helpless or hopeless.  (+) increased anxiety  Worried about the future and stressed about today.  No excessive worry.  No worry about worst case scenarios.  No panic attacks.  Sleeping ok unless takes Adderall late.  Diet: Overall pretty healthy- working on eating healthier  Exercise: Goes on walks  Caffeine use: occasional caffeine  Alcohol use: 1-2 a week  Drug use:  Took a THC gummy on Friday- she never does    Review of Systems   Constitutional:  Negative for chills, fatigue and fever.   Eyes:  Negative for visual disturbance.   Respiratory:  Negative for cough, chest tightness and shortness of breath.    Cardiovascular:  Negative for chest pain, palpitations and leg swelling.   Gastrointestinal:  Negative for abdominal pain, diarrhea, nausea and vomiting.   Neurological:  Negative for dizziness, weakness, numbness and headaches.   Psychiatric/Behavioral:  Negative for dysphoric mood, self-injury, sleep disturbance and  "suicidal ideas. The patient is nervous/anxious.        Objective   /72 (BP Location: Left arm, Patient Position: Sitting)   Pulse 73   Temp 36.3 °C (97.3 °F) (Temporal)   Ht 1.637 m (5' 4.45\")   Wt 83.3 kg (183 lb 9.6 oz)   SpO2 96%   BMI 31.08 kg/m²     Physical Exam  Constitutional:       General: She is not in acute distress.     Appearance: Normal appearance. She is not toxic-appearing.   Eyes:      Extraocular Movements: Extraocular movements intact.      Conjunctiva/sclera: Conjunctivae normal.      Pupils: Pupils are equal, round, and reactive to light.   Cardiovascular:      Rate and Rhythm: Normal rate and regular rhythm.      Pulses: Normal pulses.      Heart sounds: Normal heart sounds, S1 normal and S2 normal. No murmur heard.  Pulmonary:      Effort: Pulmonary effort is normal. No respiratory distress.      Breath sounds: Normal breath sounds.   Abdominal:      General: Bowel sounds are normal.      Palpations: Abdomen is soft.      Tenderness: There is no abdominal tenderness.   Musculoskeletal:      Right lower leg: No edema.      Left lower leg: No edema.   Lymphadenopathy:      Cervical: No cervical adenopathy.   Neurological:      Mental Status: She is alert and oriented to person, place, and time.   Psychiatric:         Attention and Perception: Attention normal.         Mood and Affect: Mood and affect normal.         Behavior: Behavior normal. Behavior is cooperative.         Thought Content: Thought content normal.         Cognition and Memory: Cognition normal.         Judgment: Judgment normal.         Assessment/Plan   Problem List Items Addressed This Visit       Anxiety    Fair control on the current dose of Cymbalta. Continue.         Attention deficit disorder without hyperactivity - Primary    Will need to follow up in 1 month to update UDS since took gummy Friday. Advised cannot prescribe Adderall until has negative UDS in one month. Will also update CSA at that time to " keep them together. Reviewed today she cannot use any THC product when on Adderall.           It has been a pleasure seeing you today!

## 2025-05-30 ENCOUNTER — APPOINTMENT (OUTPATIENT)
Dept: PRIMARY CARE | Facility: CLINIC | Age: 25
End: 2025-05-30
Payer: COMMERCIAL

## 2025-05-30 VITALS
OXYGEN SATURATION: 97 % | TEMPERATURE: 97.9 F | BODY MASS INDEX: 31.79 KG/M2 | WEIGHT: 187.8 LBS | DIASTOLIC BLOOD PRESSURE: 80 MMHG | SYSTOLIC BLOOD PRESSURE: 112 MMHG | HEART RATE: 98 BPM

## 2025-05-30 DIAGNOSIS — F98.8 ATTENTION DEFICIT DISORDER WITHOUT HYPERACTIVITY: Primary | ICD-10-CM

## 2025-05-30 DIAGNOSIS — Z51.81 THERAPEUTIC DRUG MONITORING: ICD-10-CM

## 2025-05-30 PROCEDURE — 1036F TOBACCO NON-USER: CPT | Performed by: NURSE PRACTITIONER

## 2025-05-30 PROCEDURE — 99213 OFFICE O/P EST LOW 20 MIN: CPT | Performed by: NURSE PRACTITIONER

## 2025-05-30 ASSESSMENT — ENCOUNTER SYMPTOMS
DYSPHORIC MOOD: 0
FATIGUE: 0
CHILLS: 0
FEVER: 0
NERVOUS/ANXIOUS: 0

## 2025-05-30 NOTE — PROGRESS NOTES
Subjective   Santa Jalloh is a 25 y.o. female who presents for Follow-up (Pt reports she has not taken a gummie. Urine and Contract are due today.).    HPI  She presents to the office today for an updated urine drugs screen and CSA for Adderall prescriptions.  OARRS:  No data recorded  I have personally reviewed the OARRS report for Santa Jalloh. I have considered the risks of abuse, dependence, addiction and diversion and I believe that it is clinically appropriate for Santa Jalloh to be prescribed this medication    Is the patient prescribed a combination of a benzodiazepine and opioid?  No    Last Urine Drug Screen / ordered today: Yes  No results found for this or any previous visit (from the past 8760 hours).  Results pending    Controlled Substance Agreement:  Date of the Last Agreement: 05/30/2025  Reviewed Controlled Substance Agreement including but not limited to the benefits, risks, and alternatives to treatment with a Controlled Substance medication(s).    Stimulants:   What is the patient's goal of therapy? Help focus while working at school and also to finish school work in evenings.  Is this being achieved with current treatment? yes    Activities of Daily Living:   Is your overall impression that this patient is benefiting (symptom reduction outweighs side effects) from stimulant therapy? Yes     1. Physical Functioning: Better  2. Family Relationship: Same  3. Social Relationship: Same  4. Mood: Same  5. Sleep Patterns: Same  6. Overall Function: Better    No Marijuana use since last visit.      Review of Systems   Constitutional:  Negative for chills, fatigue and fever.   Psychiatric/Behavioral:  Negative for dysphoric mood. The patient is not nervous/anxious.        Objective   /80 (BP Location: Left arm, Patient Position: Sitting)   Pulse 98   Temp 36.6 °C (97.9 °F) (Temporal)   Wt 85.2 kg (187 lb 12.8 oz)   SpO2 97%   BMI 31.79 kg/m²     Physical Exam  Constitutional:        General: She is not in acute distress.     Appearance: Normal appearance. She is not toxic-appearing.   Cardiovascular:      Rate and Rhythm: Normal rate and regular rhythm.      Pulses: Normal pulses.      Heart sounds: Normal heart sounds, S1 normal and S2 normal. No murmur heard.  Pulmonary:      Effort: Pulmonary effort is normal.      Breath sounds: Normal breath sounds and air entry.   Musculoskeletal:      Right lower leg: No edema.      Left lower leg: No edema.   Lymphadenopathy:      Cervical: No cervical adenopathy.   Neurological:      Mental Status: She is alert and oriented to person, place, and time.   Psychiatric:         Mood and Affect: Mood normal.         Behavior: Behavior normal.         Thought Content: Thought content normal.         Judgment: Judgment normal.         Assessment/Plan   Problem List Items Addressed This Visit       Attention deficit disorder without hyperactivity - Primary    Relevant Orders    Amphetamine Confirm, Urine    Drug Screen, Urine With Reflex to Confirmation     Other Visit Diagnoses         Therapeutic drug monitoring        Relevant Orders    Amphetamine Confirm, Urine    Drug Screen, Urine With Reflex to Confirmation        CSA updated today. Will review UDS and send in prescriptions.    It has been a pleasure seeing you today!

## 2025-05-31 LAB
AMPHET UR-MCNC: NORMAL NG/ML
AMPHETAMINES UR QL: NEGATIVE NG/ML
BARBITURATES UR QL: NEGATIVE NG/ML
BENZODIAZ UR QL: NEGATIVE NG/ML
BZE UR QL: NEGATIVE NG/ML
CREAT UR-MCNC: 16.8 MG/DL
FENTANYL UR QL SCN: NEGATIVE NG/ML
MDA UR-MCNC: NORMAL UG/ML
MDEA UR-MCNC: NORMAL NG/ML
MDMA UR-MCNC: NORMAL NG/ML
METHADONE UR QL: NEGATIVE NG/ML
METHAMPHET UR-MCNC: NORMAL UG/ML
OPIATES UR QL: NEGATIVE NG/ML
OXIDANTS UR QL: NEGATIVE MCG/ML
OXYCODONE UR QL: NEGATIVE NG/ML
PCP UR QL: NEGATIVE NG/ML
PH UR: 6.8 [PH] (ref 4.5–9)
PHENTERMINE UR-MCNC: NORMAL UG/ML
QUEST NOTES AND COMMENTS: ABNORMAL
SP GR UR: 1
THC UR QL: NEGATIVE NG/ML

## 2025-06-02 LAB
AMPHET UR-MCNC: 575 NG/ML
AMPHETAMINES UR QL: NEGATIVE NG/ML
BARBITURATES UR QL: NEGATIVE NG/ML
BENZODIAZ UR QL: NEGATIVE NG/ML
BZE UR QL: NEGATIVE NG/ML
CREAT UR-MCNC: 16.8 MG/DL
FENTANYL UR QL SCN: NEGATIVE NG/ML
MDA UR-MCNC: NEGATIVE NG/ML
MDEA UR-MCNC: NEGATIVE NG/ML
MDMA UR-MCNC: NEGATIVE NG/ML
METHADONE UR QL: NEGATIVE NG/ML
METHAMPHET UR-MCNC: NEGATIVE NG/ML
OPIATES UR QL: NEGATIVE NG/ML
OXIDANTS UR QL: NEGATIVE MCG/ML
OXYCODONE UR QL: NEGATIVE NG/ML
PCP UR QL: NEGATIVE NG/ML
PH UR: 6.8 [PH] (ref 4.5–9)
PHENTERMINE UR-MCNC: NEGATIVE NG/ML
QUEST NOTES AND COMMENTS: ABNORMAL
SP GR UR: 1
THC UR QL: NEGATIVE NG/ML

## 2025-06-03 DIAGNOSIS — F98.8 ATTENTION DEFICIT DISORDER WITHOUT HYPERACTIVITY: ICD-10-CM

## 2025-06-03 RX ORDER — DEXTROAMPHETAMINE SACCHARATE, AMPHETAMINE ASPARTATE, DEXTROAMPHETAMINE SULFATE AND AMPHETAMINE SULFATE 3.75; 3.75; 3.75; 3.75 MG/1; MG/1; MG/1; MG/1
15 TABLET ORAL DAILY
Qty: 30 TABLET | Refills: 0 | Status: SHIPPED | OUTPATIENT
Start: 2025-06-03 | End: 2025-07-03

## 2025-06-03 RX ORDER — DEXTROAMPHETAMINE SACCHARATE, AMPHETAMINE ASPARTATE, DEXTROAMPHETAMINE SULFATE AND AMPHETAMINE SULFATE 3.75; 3.75; 3.75; 3.75 MG/1; MG/1; MG/1; MG/1
15 TABLET ORAL DAILY
Qty: 30 TABLET | Refills: 0 | Status: SHIPPED | OUTPATIENT
Start: 2025-07-03 | End: 2025-08-02

## 2025-06-03 RX ORDER — DEXTROAMPHETAMINE SACCHARATE, AMPHETAMINE ASPARTATE, DEXTROAMPHETAMINE SULFATE AND AMPHETAMINE SULFATE 3.75; 3.75; 3.75; 3.75 MG/1; MG/1; MG/1; MG/1
1 TABLET ORAL DAILY
Qty: 30 TABLET | Refills: 0 | Status: SHIPPED | OUTPATIENT
Start: 2025-08-02 | End: 2025-09-01

## 2025-07-31 DIAGNOSIS — F41.9 ANXIETY: ICD-10-CM

## 2025-07-31 RX ORDER — DULOXETIN HYDROCHLORIDE 20 MG/1
40 CAPSULE, DELAYED RELEASE ORAL DAILY
Qty: 180 CAPSULE | Refills: 0 | Status: SHIPPED | OUTPATIENT
Start: 2025-07-31

## 2025-12-02 ENCOUNTER — APPOINTMENT (OUTPATIENT)
Dept: PRIMARY CARE | Facility: CLINIC | Age: 25
End: 2025-12-02
Payer: COMMERCIAL